# Patient Record
Sex: MALE | Race: WHITE | Employment: OTHER | URBAN - METROPOLITAN AREA
[De-identification: names, ages, dates, MRNs, and addresses within clinical notes are randomized per-mention and may not be internally consistent; named-entity substitution may affect disease eponyms.]

---

## 2020-11-18 ENCOUNTER — VIRTUAL VISIT (OUTPATIENT)
Dept: FAMILY MEDICINE | Facility: CLINIC | Age: 75
End: 2020-11-18
Payer: COMMERCIAL

## 2020-11-18 ENCOUNTER — OFFICE VISIT (OUTPATIENT)
Dept: LAB | Facility: CLINIC | Age: 75
End: 2020-11-18
Attending: INTERNAL MEDICINE
Payer: COMMERCIAL

## 2020-11-18 ENCOUNTER — NURSE TRIAGE (OUTPATIENT)
Dept: NURSING | Facility: CLINIC | Age: 75
End: 2020-11-18

## 2020-11-18 VITALS — TEMPERATURE: 98.2 F

## 2020-11-18 DIAGNOSIS — Z20.822 EXPOSURE TO COVID-19 VIRUS: Primary | ICD-10-CM

## 2020-11-18 DIAGNOSIS — Z20.822 EXPOSURE TO COVID-19 VIRUS: ICD-10-CM

## 2020-11-18 PROCEDURE — 99213 OFFICE O/P EST LOW 20 MIN: CPT | Mod: 95 | Performed by: INTERNAL MEDICINE

## 2020-11-18 PROCEDURE — 99000 SPECIMEN HANDLING OFFICE-LAB: CPT | Performed by: PATHOLOGY

## 2020-11-18 PROCEDURE — U0003 INFECTIOUS AGENT DETECTION BY NUCLEIC ACID (DNA OR RNA); SEVERE ACUTE RESPIRATORY SYNDROME CORONAVIRUS 2 (SARS-COV-2) (CORONAVIRUS DISEASE [COVID-19]), AMPLIFIED PROBE TECHNIQUE, MAKING USE OF HIGH THROUGHPUT TECHNOLOGIES AS DESCRIBED BY CMS-2020-01-R: HCPCS | Mod: 90 | Performed by: PATHOLOGY

## 2020-11-18 RX ORDER — HYDROCHLOROTHIAZIDE 25 MG/1
25 TABLET ORAL
COMMUNITY
Start: 2020-09-21

## 2020-11-18 RX ORDER — ATORVASTATIN CALCIUM 20 MG/1
20 TABLET, FILM COATED ORAL
COMMUNITY
Start: 2020-09-21

## 2020-11-18 RX ORDER — LISINOPRIL 40 MG/1
40 TABLET ORAL
COMMUNITY
Start: 2020-09-21

## 2020-11-18 NOTE — PROGRESS NOTES
"Sagar Awad is a 75 year old male who is being evaluated via a billable telephone visit.      The patient has been notified of following:     \"This telephone visit will be conducted via a call between you and your physician/provider. We have found that certain health care needs can be provided without the need for a physical exam.  This service lets us provide the care you need with a short phone conversation.  If a prescription is necessary we can send it directly to your pharmacy.  If lab work is needed we can place an order for that and you can then stop by our lab to have the test done at a later time.    Telephone visits are billed at different rates depending on your insurance coverage. During this emergency period, for some insurers they may be billed the same as an in-person visit.  Please reach out to your insurance provider with any questions.    If during the course of the call the physician/provider feels a telephone visit is not appropriate, you will not be charged for this service.\"    Patient has given verbal consent for Telephone visit?  Yes    What phone number would you like to be contacted at? [phone]     How would you like to obtain your AVS? Mail a copy    Subjective     Sagar Awad is a 75 year old male who presents via phone visit today for the following health issues:    HPI   He is visiting [place] as his wife is getting some stem cell transplant  They are living in a hotel  She is obviously sick  He was exposed to somebody with Covid 10 days ago  The person he was exposed was a high school kid, that person is completely symptomatic  He wants to get tested even though he is asymptomatic because of his wife's condition    Concern for COVID-19  About how many days ago did these symptoms start? X10 days  Is this your first visit for this illness? Yes  In the 14 days before your symptoms started, have you had close contact with someone with COVID-19 (Coronavirus)? Yes, I have been " in contact with someone who has symptoms of COVID-19/Coronavirus (no lab test done or waiting on results).  Do you have a fever or chills? No  Are you having new or worsening difficulty breathing? No  Do you have new or worsening cough? No  Have you had any new or unexplained body aches? No    Have you experienced any of the following NEW symptoms?    Headache: No    Sore throat: No    Loss of taste or smell: No    Chest pain: No    Diarrhea: No    Rash: No  What treatments have you tried? none  Who do you live with? wife  Are you, or a household member, a healthcare worker or a ? No  Do you live in a nursing home, group home, or shelter? No  Do you have a way to get food/medications if quarantined? Yes, I have a friend or family member who can help me.                   Review of Systems   Constitutional, HEENT, cardiovascular, pulmonary, gi and gu systems are negative, except as otherwise noted.       Objective          Vitals:  No vitals were obtained today due to virtual visit.    healthy, alert and no distress  PSYCH: Alert and oriented times 3; coherent speech, normal   rate and volume, able to articulate logical thoughts, able   to abstract reason, no tangential thoughts, no hallucinations   or delusions  His affect is normal  RESP: No cough, no audible wheezing, able to talk in full sentences  Remainder of exam unable to be completed due to telephone visits            Assessment/Plan:    Assessment & Plan     Exposure to COVID-19 virus  Discussed about quarantine measures  He is asymptomatic but his wife is very sick and she is getting stem cell transplant  Understandably he wants to get tested ASAP  Discussed about the current backlog with testing  Other options were given including outside facilities  Orders placed and asked him to call Guguchu Modena to to expedite the process  - Asymptomatic COVID-19 Virus (Coronavirus) by PCR; Future            Return if symptoms worsen or fail to  improve.    Ilia Jeong MD  [facility]    Phone call duration:  7 minutes

## 2020-11-18 NOTE — PATIENT INSTRUCTIONS
"  Patient Education   After Your COVID-19 (Coronavirus) Test  You have been tested for COVID-19 (coronavirus).   If you'll have surgery in the next few days, we'll let you know ahead of time if you have the virus. Please call your surgeon's office with any questions.  For all other patients: Results are usually available in [employer] within 2 to 3 days.   If you do not have a [employer] account, you'll get a letter in the mail in about 7 to 10 days.   [employer] is often the fastest way to get test results. Please sign up if you do not already have a [employer] account. See the handout Getting COVID-19 Test Results in [employer] for help.  What if my test result is positive?  If your test is positive and you have not viewed your result in [employer], you'll get a phone call with your result. (A positive test means that you have the virus.)     Follow the tips under \"How do I self-isolate?\" below for 10 days (20 days if you have a weak immune system).    You don't need to be retested for COVID-19 before going back to school or work. As long as you're fever-free and feeling better, you can go back to school, work and other activities after waiting the 10 or 20 days.  What if I have questions after I get my results?  If you have questions about your results, please visit our testing website at [email or web address].   After 7 to 10 days, if you have not gotten your results:     Call [phone] ([phone]) and ask to speak with our COVID-19 results team.    If you're being treated at an infusion center: Call your infusion center directly.  What are the symptoms of COVID-19?  Cough, fever and trouble breathing are the most common signs of COVID-19.  Other symptoms can include new headaches, new muscle or body aches, new and unexplained fatigue (feeling very tired), chills, sore throat, congestion (stuffy or runny nose), diarrhea (loose poop), loss of taste or smell, belly pain, and nausea or vomiting " "(feeling sick to your stomach or throwing up).  You may already have symptoms of COVID-19, or they may show up later.  What should I do if I have symptoms?  If you're having surgery: Call your surgeon's office.  For all other patients: Stay home and away from others (self-isolate) until ...    You've had no fever--and no medicine that reduces fever--for 1 full day (24 hours), AND    Other symptoms have gotten better. For example, your cough or breathing has improved, AND    At least 10 days have passed since your symptoms first started.  How do I self-isolate?    Stay in your own room, even for meals. Use your own bathroom if you can.    Stay away from others in your home. No hugging, kissing or shaking hands. No visitors.    Don't go to work, school or anywhere else.    Clean \"high touch\" surfaces often (doorknobs, counters, handles). Use household cleaning spray or wipes. You'll find a full list of  on the EPA website: www.epa.gov/pesticide-registration/list-n-disinfectants-use-against-sars-cov-2.    Cover your mouth and nose with a mask or other face covering to avoid spreading germs.    Wash your hands and face often. Use soap and water.    Caregivers in these groups are at risk for severe illness due to COVID-19:  ? People 65 years and older  ? People who live in a nursing home or long-term care facility  ? People with chronic disease (lung, heart, cancer, diabetes, kidney, liver, immunologic)  ? People who have a weakened immune system, including those who:    Are in cancer treatment    Take medicine that weakens the immune system, such as corticosteroids    Had a bone marrow or organ transplant    Have an immune deficiency    Have poorly controlled HIV or AIDS    Are obese (body mass index of 40 or higher)    Smoke regularly    Caregivers should wear gloves while washing dishes, handling laundry and cleaning bedrooms and bathrooms.    Use caution when washing and drying laundry: Don't shake dirty " laundry and use the warmest water setting that you can.    For more tips on managing your health at home, go to www.cdc.gov/coronavirus/2019-ncov/downloads/10Things.pdf.  How can I take care of myself at home?  1. Get lots of rest. Drink extra fluids (unless a doctor has told you not to).  2. Take Tylenol (acetaminophen) for fever or pain. If you have liver or kidney problems, ask your family doctor if it's OK to take Tylenol.   Adults can take either:  ? 650 mg (two 325 mg pills) every 4 to 6 hours, or   ? 1,000 mg (two 500 mg pills) every 8 hours as needed.  ? Note: Don't take more than 3,000 mg in one day. Acetaminophen is found in many medicines (both prescribed and over-the-counter medicines). Read all labels to be sure you don't take too much.   For children, check the Tylenol bottle for the right dose. The dose is based on the child's age or weight.  3. If you have other health problems (like cancer, heart failure, an organ transplant or severe kidney disease): Call your specialty clinic if you don't feel better in the next 2 days.  4. Know when to call 911. Emergency warning signs include:  ? Trouble breathing or shortness of breath  ? Chest pain or pressure that doesn't go away  ? Feeling confused like you haven't felt before, or not being able to wake up  ? Bluish-colored lips or face  5. If your doctor prescribed a blood thinner medicine: Follow their instructions.  Where can I get more information?    [facility] - About COVID-19:   [email or web address]    CDC - If You're Sick: cdc.gov/coronavirus/2019-ncov/about/steps-when-sick.html    CDC - Ending Home Isolation: www.cdc.gov/coronavirus/2019-ncov/hcp/disposition-in-home-patients.html    CDC - Caring for Someone: www.cdc.gov/coronavirus/2019-ncov/if-you-are-sick/care-for-someone.html    [facility] - Interim Guidance for Hospital Discharge to Home: www.health.[facility].[place]/diseases/coronavirus/hcp/hospdischarge.pdf    [facility]  clinical trials (COVID-19 research studies): clinicalaffairs.[facility]-clinical-trials    Below are the COVID-19 hotlines at the [place] Department of [facility] ([facility]). Interpreters are available.  ? For health questions: Call 800-000-5228 or 1-419.798.8518 (7 a.m. to 7 p.m.)  ? For questions about schools and childcare: Call 794-649-4277 or 1-457.660.5612 (7 a.m. to 7 p.m.)    For informational purposes only. Not to replace the advice of your health care provider. Clinically reviewed by Infection Prevention and the [facility] COVID-19 Clinical Team. Copyright   2020 [facility] [employer]. All rights reserved. [employer] 105609 - Rev 11/11/20.       Patient Education     Coronavirus Disease 2019 (COVID-19): Caring for Yourself or Others   If you or a household member have symptoms of COVID-19, follow the guidelines below. This will help you manage symptoms and keep the virus from spreading.  If you have symptoms of COVID-19    Stay home and contact your care team. They will tell you what to do.    Don t panic. Keep in mind that other illnesses can cause similar symptoms.    Stay away from work, school, and public places.    Limit physical contact with others in your home. Limit visitors. No kissing.  Clean surfaces you touch with disinfectant.  If you need to cough or sneeze, do it into a tissue. Then throw the tissue into the trash. If you don't have tissues, cough or sneeze into the bend of your elbow.  Don t share food or personal items with people in your household. This includes items like eating and drinking utensils, towels, and bedding.  Wear a cloth face mask around other people. During a public health emergency, medical face masks may be reserved for healthcare workers. You may need to make a cloth face mask of your own. You can do this using a bandana, T-shirt, or other cloth. The CDC has instructions on how to make a face mask. Wear the mask so that it covers both your nose and mouth.  If you  need to go to a hospital or clinic, call ahead to let them know. Expect the care team to wear masks, gowns, gloves, and eye protection. You may be put in a separate room.  Follow all instructions from your care team.    If you ve been diagnosed with COVID-19    Stay home and away from others, including other people in your home. (This is called self-isolation.) Don t leave home unless you need to get medical care. Don t go to work, school, or public places. Don t use buses, taxis, or other public transportation.    Follow all instructions from your care team.    If you need to go to a hospital or clinic, call ahead to let them know. Expect the care team to wear masks, gowns, gloves, and eye protection. You may be put in a separate room.    Wear a face mask over your nose and mouth. This is to protect others from your germs. If you can t wear a mask, your caregivers should wear one. You may need to make your own mask using a bandana, T-shirt, or other cloth. See the CDC s instructions on how to make a face mask.    Have no contact with pets and other animals.    Don t share food or personal items with people in your household. This includes items like eating and drinking utensils, towels, and bedding.    If you need to cough or sneeze, do it into a tissue. Then throw the tissue into the trash. If you don't have tissues, cough or sneeze into the bend of your elbow.    Wash your hands often.    Self-care at home   At this time, there is no medicine approved to prevent or treat COVID-19. Experts are testing different medicines, trying to find one that works.  So far, the most proven treatment is to support your body while it fights the virus.    Getting extra rest.    Drink extra fluids 6 to 8 glasses of liquids each day), unless a doctor has told you not to. Ask your care team which fluids are best for you. Avoid fluids that contain caffeine or alcohol.    Taking over-the-counter (OTC) medicine to reduce pain and  fever. Your care team will tell you which medicine to use.  If you ve been in the hospital for COVID-19, follow your care team s instructions. They will tell you when to stop self-isolation. They may also have you change positions to help your breathing (such as lying on your belly).  If a test showed that you have COVID-19, you may be asked to donate plasma after you ve recovered. (This is called COVID-19 convalescent plasma donation.) The plasma may contain antibodies to help fight the virus in others. Scientists are testing whether this might be a treatment in the future. For more information, talk to your care team.  Caring for a sick person     Follow all instructions from the care team.    Wash your hands often.    Wear protective clothing as advised.    Make sure the sick person wears a mask. If they can't wear a mask, don t stay in the same room with them. If you must be in the same room, wear a face mask. Make sure the mask covers both the nose and mouth.    Keep track of the sick person s symptoms.    Clean surfaces often with disinfectant. This includes phones, kitchen counters, fridge door handles, bathroom surfaces, and others.    Don t let anyone share household items with the sick person. This includes eating and drinking tools, towels, sheets, or blankets.    Clean fabrics and laundry well.    Keep other people and pets away from the sick person.    When you can stop self-isolation  When you are sick with COVID-19, you should stay away from other people. This is called self-isolation. The rules for ending self-isolation depend on your health in general.  If you are normally healthy  You can stop self-isolation when all 3 of these are true:    You ve had no fever--and no medicine that reduces fever--for at least 24 hours. And     Your symptoms are better, such as cough or trouble breathing. And     At least 10 days have passed since your symptoms first started.  Talk with your care team before you  leave home. They may tell you it s okay to leave, or they may give you different advice.  If you have a weak immune system  If you re being treated for cancer, have an immune disorder (such as HIV), or have had a transplant &$40;organ or bone marrow), follow your care team s instructions. You may be asked to stay home from 10 days to 20 days after your symptoms first started. Your care team may want to re-test you for COVID-19.  When you return to public settings  When you are well enough to go outside your home, follow the CDC s guidance on cloth face masks.    Anyone over age 2 should wear a face mask in public, especially when it's hard to socially distance. This includes public transit, public protests and marches, and crowded stores, bars, and restaurants.    Face masks are most likely to reduce the spread of COVID-19 when they are widely used by people who are out in the public.  Certain people should not wear a face covering. These include:    Children under 2 years old    Anyone with a health, developmental, or mental health condition that can be made worse by wearing a mask    Anyone who is unconscious or unable to remove the face covering without help. See the CDC's guidance on who should not wear a face mask.  When to call your care team  Call your care team right away if a sick person has any of these:    Trouble breathing    Pain or pressure in chest  If a sick person has any of these, call 911:    Trouble breathing that gets worse    Pain or pressure in chest that gets worse    Blue tint to lips or face    Fast or irregular heartbeat    Confusion or trouble waking    Fainting or loss of consciousness    Coughing up blood  Going home from the hospital   If you have COVID-19 and were recently in the hospital:    Follow the instructions above for self-care and isolation.    Follow the hospital care team s instructions.    Ask questions if anything is unclear to you. Write down answers so you remember  them.  Date last modified: 10/13/2020  Nila last reviewed this educational content on 4/1/2020  This information has been modified by your health care provider with permission from the publisher.    5403-2009 [employer]. [address] [place] [address], [place] [address]. All rights reserved. This information is not intended as a substitute for professional medical care. Always follow your healthcare professional's instructions.

## 2020-11-18 NOTE — TELEPHONE ENCOUNTER
Triage Call:    Patient calling to schedule COVID test.   Pt has test scheduled already today and needs to reschedule.   Pt transferred to scheduling to make appointment.     Marycarmen Santana RN 11/18/20 9:52 AM  [facility] Nurse Advisor          Reason for Disposition    Requesting regular office appointment     Reschedule covid test.    Additional Information    Negative: RN needs further essential information from caller in order to complete triage    Negative: [1] Caller is not with the adult (patient) AND [2] reporting urgent symptoms    Negative: Lab result questions    Negative: Medication questions    Negative: Caller can't be reached by phone    Negative: Caller has already spoken to PCP or another triager    Protocols used: INFORMATION ONLY CALL-A-

## 2020-11-19 LAB
SARS-COV-2 RNA SPEC QL NAA+PROBE: NOT DETECTED
SPECIMEN SOURCE: NORMAL